# Patient Record
Sex: MALE | URBAN - METROPOLITAN AREA
[De-identification: names, ages, dates, MRNs, and addresses within clinical notes are randomized per-mention and may not be internally consistent; named-entity substitution may affect disease eponyms.]

---

## 2024-10-31 ENCOUNTER — HOSPITAL ENCOUNTER (INPATIENT)
Age: 54
LOS: 4 days | Discharge: HOME | DRG: 885 | End: 2024-11-04
Attending: PSYCHIATRY & NEUROLOGY | Admitting: PSYCHIATRY & NEUROLOGY
Payer: COMMERCIAL

## 2024-10-31 PROBLEM — F23 ACUTE PSYCHOSIS (HCC): Status: ACTIVE | Noted: 2024-10-31

## 2024-10-31 PROCEDURE — 6370000000 HC RX 637 (ALT 250 FOR IP): Performed by: PSYCHIATRY & NEUROLOGY

## 2024-10-31 PROCEDURE — 2040000000 HC PSYCH ICU R&B

## 2024-10-31 RX ORDER — LORAZEPAM 2 MG/ML
2 INJECTION INTRAMUSCULAR EVERY 6 HOURS PRN
Status: DISCONTINUED | OUTPATIENT
Start: 2024-10-31 | End: 2024-11-04 | Stop reason: HOSPADM

## 2024-10-31 RX ORDER — POLYETHYLENE GLYCOL 3350 17 G
2 POWDER IN PACKET (EA) ORAL
Status: DISCONTINUED | OUTPATIENT
Start: 2024-10-31 | End: 2024-11-04 | Stop reason: HOSPADM

## 2024-10-31 RX ORDER — IBUPROFEN 400 MG/1
400 TABLET, FILM COATED ORAL EVERY 6 HOURS PRN
Status: DISCONTINUED | OUTPATIENT
Start: 2024-10-31 | End: 2024-11-04 | Stop reason: HOSPADM

## 2024-10-31 RX ORDER — ACETAMINOPHEN 325 MG/1
650 TABLET ORAL EVERY 6 HOURS PRN
Status: DISCONTINUED | OUTPATIENT
Start: 2024-10-31 | End: 2024-11-04 | Stop reason: HOSPADM

## 2024-10-31 RX ORDER — DIPHENHYDRAMINE HYDROCHLORIDE 50 MG/ML
50 INJECTION INTRAMUSCULAR; INTRAVENOUS EVERY 6 HOURS PRN
Status: DISCONTINUED | OUTPATIENT
Start: 2024-10-31 | End: 2024-11-04 | Stop reason: HOSPADM

## 2024-10-31 RX ORDER — LORAZEPAM 1 MG/1
2 TABLET ORAL EVERY 6 HOURS PRN
Status: DISCONTINUED | OUTPATIENT
Start: 2024-10-31 | End: 2024-11-04 | Stop reason: HOSPADM

## 2024-10-31 RX ORDER — TRAZODONE HYDROCHLORIDE 50 MG/1
50 TABLET, FILM COATED ORAL NIGHTLY PRN
Status: DISCONTINUED | OUTPATIENT
Start: 2024-10-31 | End: 2024-11-04 | Stop reason: HOSPADM

## 2024-10-31 RX ORDER — MAGNESIUM HYDROXIDE/ALUMINUM HYDROXICE/SIMETHICONE 120; 1200; 1200 MG/30ML; MG/30ML; MG/30ML
30 SUSPENSION ORAL EVERY 6 HOURS PRN
Status: DISCONTINUED | OUTPATIENT
Start: 2024-10-31 | End: 2024-11-04 | Stop reason: HOSPADM

## 2024-10-31 RX ORDER — HALOPERIDOL 5 MG/1
5 TABLET ORAL EVERY 6 HOURS PRN
Status: DISCONTINUED | OUTPATIENT
Start: 2024-10-31 | End: 2024-11-04 | Stop reason: HOSPADM

## 2024-10-31 RX ORDER — POLYETHYLENE GLYCOL 3350 17 G/17G
17 POWDER, FOR SOLUTION ORAL DAILY PRN
Status: DISCONTINUED | OUTPATIENT
Start: 2024-10-31 | End: 2024-11-04 | Stop reason: HOSPADM

## 2024-10-31 RX ORDER — HYDROXYZINE HYDROCHLORIDE 50 MG/1
50 TABLET, FILM COATED ORAL 3 TIMES DAILY PRN
Status: DISCONTINUED | OUTPATIENT
Start: 2024-10-31 | End: 2024-11-04 | Stop reason: HOSPADM

## 2024-10-31 RX ORDER — HALOPERIDOL 5 MG/ML
5 INJECTION INTRAMUSCULAR EVERY 6 HOURS PRN
Status: DISCONTINUED | OUTPATIENT
Start: 2024-10-31 | End: 2024-11-04 | Stop reason: HOSPADM

## 2024-10-31 RX ADMIN — TRAZODONE HYDROCHLORIDE 50 MG: 50 TABLET ORAL at 23:21

## 2024-10-31 RX ADMIN — HYDROXYZINE HYDROCHLORIDE 50 MG: 50 TABLET, FILM COATED ORAL at 23:21

## 2024-10-31 ASSESSMENT — PATIENT HEALTH QUESTIONNAIRE - PHQ9
1. LITTLE INTEREST OR PLEASURE IN DOING THINGS: SEVERAL DAYS
SUM OF ALL RESPONSES TO PHQ QUESTIONS 1-9: 2
SUM OF ALL RESPONSES TO PHQ QUESTIONS 1-9: 2
SUM OF ALL RESPONSES TO PHQ9 QUESTIONS 1 & 2: 2
SUM OF ALL RESPONSES TO PHQ QUESTIONS 1-9: 2
2. FEELING DOWN, DEPRESSED OR HOPELESS: SEVERAL DAYS
SUM OF ALL RESPONSES TO PHQ QUESTIONS 1-9: 2

## 2024-10-31 ASSESSMENT — SLEEP AND FATIGUE QUESTIONNAIRES
DO YOU HAVE DIFFICULTY SLEEPING: YES
AVERAGE NUMBER OF SLEEP HOURS: 0
SLEEP PATTERN: DIFFICULTY FALLING ASLEEP;DISTURBED/INTERRUPTED SLEEP;RESTLESSNESS
DO YOU USE A SLEEP AID: NO

## 2024-10-31 ASSESSMENT — LIFESTYLE VARIABLES
HOW OFTEN DO YOU HAVE A DRINK CONTAINING ALCOHOL: NEVER
HOW MANY STANDARD DRINKS CONTAINING ALCOHOL DO YOU HAVE ON A TYPICAL DAY: PATIENT DOES NOT DRINK

## 2024-11-01 PROBLEM — E29.1 HYPOGONADISM MALE: Status: ACTIVE | Noted: 2024-11-01

## 2024-11-01 PROCEDURE — 2040000000 HC PSYCH ICU R&B

## 2024-11-01 PROCEDURE — 6370000000 HC RX 637 (ALT 250 FOR IP): Performed by: PSYCHIATRY & NEUROLOGY

## 2024-11-01 PROCEDURE — APPSS60 APP SPLIT SHARED TIME 46-60 MINUTES

## 2024-11-01 PROCEDURE — 99223 1ST HOSP IP/OBS HIGH 75: CPT | Performed by: PSYCHIATRY & NEUROLOGY

## 2024-11-01 PROCEDURE — 99222 1ST HOSP IP/OBS MODERATE 55: CPT | Performed by: INTERNAL MEDICINE

## 2024-11-01 RX ORDER — TESTOSTERONE CYPIONATE 200 MG/ML
150 VIAL (ML) INTRAMUSCULAR WEEKLY
Status: ON HOLD | COMMUNITY
Start: 2024-08-01 | End: 2024-11-04 | Stop reason: HOSPADM

## 2024-11-01 RX ORDER — HYDROXYZINE HYDROCHLORIDE 25 MG/1
25 TABLET, FILM COATED ORAL EVERY 6 HOURS PRN
COMMUNITY

## 2024-11-01 RX ORDER — QUETIAPINE FUMARATE 100 MG/1
100 TABLET, FILM COATED ORAL 2 TIMES DAILY
Status: DISCONTINUED | OUTPATIENT
Start: 2024-11-01 | End: 2024-11-02

## 2024-11-01 RX ADMIN — NICOTINE POLACRILEX 2 MG: 4 LOZENGE ORAL at 15:48

## 2024-11-01 RX ADMIN — NICOTINE POLACRILEX 2 MG: 4 LOZENGE ORAL at 20:17

## 2024-11-01 RX ADMIN — TRAZODONE HYDROCHLORIDE 50 MG: 50 TABLET ORAL at 21:09

## 2024-11-01 RX ADMIN — NICOTINE POLACRILEX 2 MG: 4 LOZENGE ORAL at 13:40

## 2024-11-01 RX ADMIN — NICOTINE POLACRILEX 2 MG: 4 LOZENGE ORAL at 11:40

## 2024-11-01 RX ADMIN — NICOTINE POLACRILEX 2 MG: 4 LOZENGE ORAL at 17:58

## 2024-11-01 RX ADMIN — HYDROXYZINE HYDROCHLORIDE 50 MG: 50 TABLET, FILM COATED ORAL at 21:09

## 2024-11-01 RX ADMIN — NICOTINE POLACRILEX 2 MG: 4 LOZENGE ORAL at 09:33

## 2024-11-01 RX ADMIN — QUETIAPINE FUMARATE 100 MG: 100 TABLET ORAL at 21:09

## 2024-11-01 ASSESSMENT — LIFESTYLE VARIABLES
HOW OFTEN DO YOU HAVE A DRINK CONTAINING ALCOHOL: NEVER
HOW MANY STANDARD DRINKS CONTAINING ALCOHOL DO YOU HAVE ON A TYPICAL DAY: PATIENT DOES NOT DRINK
HOW OFTEN DO YOU HAVE A DRINK CONTAINING ALCOHOL: PATIENT DECLINED
HOW MANY STANDARD DRINKS CONTAINING ALCOHOL DO YOU HAVE ON A TYPICAL DAY: PATIENT DECLINED

## 2024-11-01 NOTE — GROUP NOTE
Group Therapy Note    Date: 11/1/2024    Group Start Time: 0900  Group End Time: 0930  Group Topic: Community Meeting    Tabitha Fletcher        Group Therapy Note    Attendees: 6/7       Patient's Goal:  \"Speak to the doctor and come up with a plan\"    Notes:  Goal setting    Status After Intervention:  Unchanged    Participation Level: Interactive    Participation Quality: Appropriate      Speech:  normal      Thought Process/Content: Logical      Affective Functioning: Congruent      Mood: depressed      Level of consciousness:  Alert      Response to Learning: Able to verbalize current knowledge/experience      Endings: None Reported    Modes of Intervention: Education, Support, Socialization, and Exploration      Discipline Responsible: Behavorial Health Tech      Signature:  Tabitha Beth

## 2024-11-01 NOTE — PLAN OF CARE
Problem: Self Harm/Suicidality  Goal: Will have no self-injury during hospital stay  Description: INTERVENTIONS:  1.  Ensure constant observer at bedside with Q15M safety checks  2.  Maintain a safe environment  3.  Secure patient belongings  4.  Ensure family/visitors adhere to safety recommendations  5.  Ensure safety tray has been added to patient's diet order  6.  Every shift and PRN: Re-assess suicidal risk via Frequent Screener    Outcome: Progressing   Patient denies suicidal ideas at this time. No sign of self harm noted. Patient encouraged to attend groups to work on coping skills for life stressors.  Problem: Involuntary Admit  Goal: Will cooperate with staff recommendations and doctor's orders and will demonstrate appropriate behavior  Description: INTERVENTIONS:  1. Treat underlying conditions and offer medication as ordered  2. Educate regarding involuntary admission procedures and rules  3. Contain excessive/inappropriate behavior per unit and hospital policies  Outcome: Progressing   Patient accepting of medication administration at this time and has demonstrated appropriate behaviors on the unit at this time.   Problem: Risk for Elopement  Goal: Patient will not exit the unit/facility without proper excort  Outcome: Progressing   Patient has not attempted to exit unit or facility at this time.Will continue to provide support as needed.

## 2024-11-01 NOTE — PLAN OF CARE
Problem: Self Harm/Suicidality  Goal: Will have no self-injury during hospital stay  Description: INTERVENTIONS:  1.  Ensure constant observer at bedside with Q15M safety checks  2.  Maintain a safe environment  3.  Secure patient belongings  4.  Ensure family/visitors adhere to safety recommendations  5.  Ensure safety tray has been added to patient's diet order  6.  Every shift and PRN: Re-assess suicidal risk via Frequent Screener    11/1/2024 0859 by Hilaria Gary, RN  Outcome: Progressing   Patient alert and oriented, brightened  denies suicidal and homicidal thoughts. Patient stated no hallucinations, paranoia  nor delusions .   Patient denies any anxiety or depression.  Patient reported sleep and appetite are good. Patient stated \" I don't want to take psych medications. The only medication I took was testosterone last Wednesday. The side effect give me an adrenaline rush. I don't want to be on any medications.\"  Patient stated \"I have a home to go to with my wife when I'm discharged\". Patient reported showered today. Will continue 15 minute safety checks.     Problem: Involuntary Admit  Goal: Will cooperate with staff recommendations and doctor's orders and will demonstrate appropriate behavior  Description: INTERVENTIONS:  1. Treat underlying conditions and offer medication as ordered  2. Educate regarding involuntary admission procedures and rules  3. Contain excessive/inappropriate behavior per unit and hospital policies  11/1/2024 0859 by Hilaria Gary, RN  Outcome: Progressing  Patient alert and oriented, brightened. Patient  socializing with peers. Patient reported don't want psych medications, is discharged focused.

## 2024-11-01 NOTE — H&P
this or any previous visit (from the past 24 hour(s)).    Imaging/Diagonstics:  No results found.    Assessment :      Hospital Problems             Last Modified POA    * (Principal) Acute psychosis (HCC) 10/31/2024 Yes    Hypogonadism male 11/1/2024 Yes       Plan:     Admitted to inpatient psych with acute psychosis,  Hypogonadism was on testosterone in the past, will follow with primary care physician,  Labs, radiology, medications, vitals reviewed,  Current smoker, advised to quit smoking    DVT prophylaxis,  Pt mobile   Full code status       Consultations:   IP CONSULT TO INTERNAL MEDICINE      Connie Cabezas MD  11/1/2024  5:21 PM    Copy sent to Dr. Garcia primary care provider on file.    Please note that this chart was generated using voice recognition Dragon dictation software.  Although every effort was made to ensure the accuracy of this automated transcription, some errors in transcription may have occurred.    
Elevated  Affect: Mood-congruent  Thought processes:  Linear and coherent but illogical in his delusions  Thought content: Denies suicidal ideations              Denies homicidal ideations   Hallucinations: Patient admits to both auditory and visual hallucinations  Delusions: Patient is grandiose, paranoid, and religiously preoccupied  Cognition:  Oriented to self, location, time, situation  Concentration: Clinically adequate  Memory: Intact  Insight &Judgment: Poor         DSM-5 Diagnosis    Principal Problem: Acute psychosis (HCC)        Psychosocial and Contextual factors:  Financial X  Occupational X  Relationship X  Legal   Living situation   Educational     History reviewed. No pertinent past medical history.     PATIENT HANDOFF:  Home medications reviewed.   Medication management per attending  Monitor need and frequency of PRN medications.    CONSULT:  [x] Yes [] No  Internal medicine for medical management/medical H&P      Risk Management: close watch per standard protocol      Psychotherapy: participation in milieu and group and individual sessions with Attending Physician,  and Physician Assistant/CNP      Estimated length of stay:  2-14 days      GENERAL PATIENT/FAMILY EDUCATION  Patient will understand basic signs and symptoms, patient will understand benefits/risks and potential side effects from proposed medications, and patient will understand their role in recovery.  Family is active in patient's care.   Patient assets that may be helpful during treatment include: Intent to participate and engage in treatment, sufficient fund of knowledge and intellect to understand and utilize treatments.    OUTCOME QUESTIONNAIRE (OQ 30.2):  [] Completed [x] Patient too ill to participate    Goals:    1) Remission of acute psychosis.  2) Stabilization of symptoms prior to discharge.  3) Establish efficacy and tolerability of medications.       Behavioral Services  Medicare Certification     Admission 
no

## 2024-11-01 NOTE — GROUP NOTE
Group Therapy Note    Date: 11/1/2024    Group Start Time: 1100  Group End Time: 1145  Group Topic: Psychoeducation    [facility]    Naima Call CTRS    Group Therapy Note    Attendees: 4/7     Patient's Goal:  Patient will demonstrate improved interpersonal skills    Notes:  Patient attended group and participated    Status After Intervention:  Improved    Participation Level: Interactive but often monopolizing    Participation Quality: Attentive, Sharing, and Intrusive      Speech:  pressured      Thought Process/Content: Delusional      Affective Functioning: Congruent      Mood: dysphoric      Level of consciousness:  Alert, Oriented x4, and Preoccupied      Response to Learning: Able to verbalize current knowledge/experience, Able to verbalize/acknowledge new learning, and Able to retain information      Endings: None Reported    Modes of Intervention: Education, Support, Socialization, and Exploration      Discipline Responsible: Psychoeducational Specialist      Signature:  SUMEET Boykin

## 2024-11-01 NOTE — CARE COORDINATION
[facility] Biopsychosocial Assessment    Current Level of Psychosocial Functioning     Independent   Dependent    Minimal Assist XX    Psychosocial High Risk Factors (check all that apply)    Unable to obtain meds   Chronic illness/pain    Substance abuse   Lack of Family Support   Financial stress   Isolation   Inadequate Community Resources  Suicide attempt(s)  Not taking medications   Victim of crime   Developmental Delay  Unable to manage personal needs    Age 65 or older   Homeless  No transportation   Readmission within 30 days  Unemployment  Traumatic Event XX    Psychiatric Advanced Directives: N/A    Family to Involve in Treatment: Wife    Sexual Orientation:  N/A    Patient Strengths: Housing, Income, Insurance    Patient Barriers: Psychosis    Opiate Education Provided:  Denies    [facility]/mental health history: No inpatient history. Not linked to [facility].    Plan of Care   medication management, group/individual therapies, family meetings, psycho -education, treatment team meetings to assist with stabilization    Initial Discharge Plan:  Return home, Needs linked to [facility]    Clinical Summary:    Sin is a 53yo admitted to the [facility] for acute psychosis. At time of assessment, he denies suicidal and homicidal ideations and hallucinations. He makes several bizarre statements throughout and stares at the ceiling the entire assessment, including inviting the writer to live with him and his wife as well as stating he has \"enough money to support the world and it's people.\" He also states \"I'm extremely intelligent,' without provocation.    Sin denies inpatient treatment history and is not linked to a [facility]. He states \"My radha beautiful beautiful wife is all I need,\" when asked about seeing a therapist or taking medications.  He denies opiate and other substance use; Informed of [facility] treatment resources.  He denies past or current legal concerns.  He denies past or current abuse concerns. He does report trauma; His son  in

## 2024-11-01 NOTE — GROUP NOTE
Group Therapy Note    Date: 11/1/2024    Group Start Time: 1400  Group End Time: 1430  Group Topic: Group Documentation    Tabitha Fletcher        Group Therapy Note    Attendees: 3/6       Patient's Goal:  Attend and participate in wellness group    Notes:  Spirituality as a coping mechanism    Status After Intervention:  Improved    Participation Level: Interactive    Participation Quality: Inappropriate      Speech:  pressured      Thought Process/Content: Delusional  Flight of ideas      Affective Functioning: Incongruent      Mood: anxious      Level of consciousness:  Alert      Response to Learning: Able to verbalize current knowledge/experience      Endings: None Reported    Modes of Intervention: Education, Support, Socialization, and Exploration      Discipline Responsible: Behavorial Health Tech      Signature:  Tabitha Beth

## 2024-11-02 LAB
CHOLEST SERPL-MCNC: 128 MG/DL (ref 0–199)
CHOLESTEROL/HDL RATIO: 3.5
EST. AVERAGE GLUCOSE BLD GHB EST-MCNC: 105 MG/DL
HBA1C MFR BLD: 5.3 % (ref 4–6)
HDLC SERPL-MCNC: 37 MG/DL
LDLC SERPL CALC-MCNC: 72 MG/DL (ref 0–100)
TRIGL SERPL-MCNC: 97 MG/DL (ref 0–149)

## 2024-11-02 PROCEDURE — 36415 COLL VENOUS BLD VENIPUNCTURE: CPT

## 2024-11-02 PROCEDURE — APPSS30 APP SPLIT SHARED TIME 16-30 MINUTES

## 2024-11-02 PROCEDURE — 99232 SBSQ HOSP IP/OBS MODERATE 35: CPT | Performed by: PSYCHIATRY & NEUROLOGY

## 2024-11-02 PROCEDURE — 83036 HEMOGLOBIN GLYCOSYLATED A1C: CPT

## 2024-11-02 PROCEDURE — 99232 SBSQ HOSP IP/OBS MODERATE 35: CPT | Performed by: INTERNAL MEDICINE

## 2024-11-02 PROCEDURE — 1240000000 HC EMOTIONAL WELLNESS R&B

## 2024-11-02 PROCEDURE — 80061 LIPID PANEL: CPT

## 2024-11-02 PROCEDURE — 6370000000 HC RX 637 (ALT 250 FOR IP): Performed by: PSYCHIATRY & NEUROLOGY

## 2024-11-02 RX ORDER — QUETIAPINE FUMARATE 200 MG/1
200 TABLET, FILM COATED ORAL 2 TIMES DAILY
Status: DISCONTINUED | OUTPATIENT
Start: 2024-11-02 | End: 2024-11-04 | Stop reason: HOSPADM

## 2024-11-02 RX ORDER — AMLODIPINE BESYLATE 5 MG/1
5 TABLET ORAL DAILY
Status: DISCONTINUED | OUTPATIENT
Start: 2024-11-02 | End: 2024-11-04 | Stop reason: HOSPADM

## 2024-11-02 RX ADMIN — QUETIAPINE FUMARATE 200 MG: 200 TABLET ORAL at 21:30

## 2024-11-02 RX ADMIN — TRAZODONE HYDROCHLORIDE 50 MG: 50 TABLET ORAL at 21:30

## 2024-11-02 RX ADMIN — HYDROXYZINE HYDROCHLORIDE 50 MG: 50 TABLET, FILM COATED ORAL at 21:30

## 2024-11-02 RX ADMIN — QUETIAPINE FUMARATE 100 MG: 100 TABLET ORAL at 09:09

## 2024-11-02 RX ADMIN — NICOTINE POLACRILEX 2 MG: 4 LOZENGE ORAL at 09:09

## 2024-11-02 RX ADMIN — NICOTINE POLACRILEX 2 MG: 4 LOZENGE ORAL at 11:59

## 2024-11-02 RX ADMIN — NICOTINE POLACRILEX 2 MG: 4 LOZENGE ORAL at 19:06

## 2024-11-02 RX ADMIN — NICOTINE POLACRILEX 2 MG: 4 LOZENGE ORAL at 21:32

## 2024-11-02 ASSESSMENT — LIFESTYLE VARIABLES
HOW MANY STANDARD DRINKS CONTAINING ALCOHOL DO YOU HAVE ON A TYPICAL DAY: PATIENT DECLINED
HOW OFTEN DO YOU HAVE A DRINK CONTAINING ALCOHOL: PATIENT DECLINED

## 2024-11-02 NOTE — PLAN OF CARE
Problem: Self Harm/Suicidality  Goal: Will have no self-injury during hospital stay  Description: INTERVENTIONS:  1.  Ensure constant observer at bedside with Q15M safety checks  2.  Maintain a safe environment  3.  Secure patient belongings  4.  Ensure family/visitors adhere to safety recommendations  5.  Ensure safety tray has been added to patient's diet order  6.  Every shift and PRN: Re-assess suicidal risk via Frequent Screener    11/1/2024 2009 by Claribel Nair LPN  Note: Patient denies thoughts of self harm at this time. Patient agrees to seek out staff if they begin having thoughts of harming self or they need to talk. Q15min safety checks continue     Problem: Involuntary Admit  Goal: Will cooperate with staff recommendations and doctor's orders and will demonstrate appropriate behavior  Description: INTERVENTIONS:  1. Treat underlying conditions and offer medication as ordered  2. Educate regarding involuntary admission procedures and rules  3. Contain excessive/inappropriate behavior per unit and hospital policies  11/1/2024 2009 by Claribel Nair LPN  Note: Patient denies suicidal thoughts and hallucinations. He reports minimal depression and anxiety. He is slightly dismissive during talk time and initially kept his eyes closed and his face in the pillow, he then sat up to answer questions and on occasion had inappropriate laughter. He has been out in the milieu at times and social with peers. Q15min safety checks continue

## 2024-11-02 NOTE — GROUP NOTE
Group Therapy Note    Date: 11/2/2024    Group Start Time: 1005  Group End Time: 1025  Group Topic: Psychoeducation    Omaira Sandoval MSW, LSW        Group Therapy Note    Attendees: 3/10       Patient's Goal:  Expression of feeling    Notes:  Therapeutic conversation game provided and discussed.    Status After Intervention:  Unchanged    Participation Level: Monopolizing    Participation Quality: Intrusive      Speech:  normal      Thought Process/Content: Flight of ideas      Affective Functioning: Exaggerated      Mood: euthymic      Level of consciousness:  Alert and Oriented x4      Response to Learning: Progressing to goal      Endings: None Reported    Modes of Intervention: Support      Discipline Responsible: /Counselor      Signature:  NADIR Schmidt LSW

## 2024-11-02 NOTE — PLAN OF CARE
Problem: Self Harm/Suicidality  Goal: Will have no self-injury during hospital stay  Description: INTERVENTIONS:  1.  Ensure constant observer at bedside with Q15M safety checks  2.  Maintain a safe environment  3.  Secure patient belongings  4.  Ensure family/visitors adhere to safety recommendations  5.  Ensure safety tray has been added to patient's diet order  6.  Every shift and PRN: Re-assess suicidal risk via Frequent Screener    Outcome: Progressing  Note: Patient denies all, shows poor insight into his mental health and repeats \"this is all just temporary\" when attempting to discuss with staff. He is cooperative with staff and peers, however can be intrusive and over step boundaries. Patient has remained in behavorial control while on unit and has not required emergency medications in the last 24 hours. Patient denies any suicidal/homicidal ideation at this time. Will continue to monitor, assess, and provide a safe environment through Q15 minute safety checks.      Problem: Involuntary Admit  Goal: Will cooperate with staff recommendations and doctor's orders and will demonstrate appropriate behavior  Description: INTERVENTIONS:  1. Treat underlying conditions and offer medication as ordered  2. Educate regarding involuntary admission procedures and rules  3. Contain excessive/inappropriate behavior per unit and hospital policies  Outcome: Not Progressing

## 2024-11-03 PROCEDURE — APPSS30 APP SPLIT SHARED TIME 16-30 MINUTES

## 2024-11-03 PROCEDURE — 99232 SBSQ HOSP IP/OBS MODERATE 35: CPT | Performed by: INTERNAL MEDICINE

## 2024-11-03 PROCEDURE — 1240000000 HC EMOTIONAL WELLNESS R&B

## 2024-11-03 PROCEDURE — 6370000000 HC RX 637 (ALT 250 FOR IP): Performed by: PSYCHIATRY & NEUROLOGY

## 2024-11-03 PROCEDURE — 90833 PSYTX W PT W E/M 30 MIN: CPT | Performed by: PSYCHIATRY & NEUROLOGY

## 2024-11-03 PROCEDURE — 99232 SBSQ HOSP IP/OBS MODERATE 35: CPT | Performed by: PSYCHIATRY & NEUROLOGY

## 2024-11-03 RX ADMIN — NICOTINE POLACRILEX 2 MG: 4 LOZENGE ORAL at 17:00

## 2024-11-03 RX ADMIN — HYDROXYZINE HYDROCHLORIDE 50 MG: 50 TABLET, FILM COATED ORAL at 22:02

## 2024-11-03 RX ADMIN — QUETIAPINE FUMARATE 200 MG: 200 TABLET ORAL at 20:21

## 2024-11-03 RX ADMIN — TRAZODONE HYDROCHLORIDE 50 MG: 50 TABLET ORAL at 22:02

## 2024-11-03 RX ADMIN — HALOPERIDOL 5 MG: 5 TABLET ORAL at 01:58

## 2024-11-03 RX ADMIN — NICOTINE POLACRILEX 2 MG: 4 LOZENGE ORAL at 20:21

## 2024-11-03 RX ADMIN — QUETIAPINE FUMARATE 200 MG: 200 TABLET ORAL at 09:15

## 2024-11-03 RX ADMIN — NICOTINE POLACRILEX 2 MG: 4 LOZENGE ORAL at 09:16

## 2024-11-03 RX ADMIN — LORAZEPAM 2 MG: 1 TABLET ORAL at 01:58

## 2024-11-03 RX ADMIN — NICOTINE POLACRILEX 2 MG: 4 LOZENGE ORAL at 13:03

## 2024-11-03 NOTE — GROUP NOTE
Group Therapy Note    Date: 11/3/2024    Group Start Time: 1400  Group End Time: 1500  Group Topic: Group Documentation    [facility] BHClaribel Hardwick LPN        Group Therapy Note    Attendees: 5/10       Patient's Goal:  coping skills at home     Notes:  Patient was able to share and discover coping skills with peers     Status After Intervention:  Improved    Participation Level: Active Listener and Interactive    Participation Quality: Appropriate, Attentive, and Sharing      Speech:  normal      Thought Process/Content: Logical  Linear      Affective Functioning: Congruent      Mood: euthymic      Level of consciousness:  Alert and Oriented x4      Response to Learning: Progressing to goal      Endings: None Reported    Modes of Intervention: Education, Support, and Socialization      Discipline Responsible: Licensed Practical Nurse      Signature:  Claribel Castillo LPN

## 2024-11-03 NOTE — PLAN OF CARE
Problem: Self Harm/Suicidality  Goal: Will have no self-injury during hospital stay  Description: INTERVENTIONS:  1.  Ensure constant observer at bedside with Q15M safety checks  2.  Maintain a safe environment  3.  Secure patient belongings  4.  Ensure family/visitors adhere to safety recommendations  5.  Ensure safety tray has been added to patient's diet order  6.  Every shift and PRN: Re-assess suicidal risk via Frequent Screener    Outcome: Progressing   Patient denies both suicidal and homicidal ideations. Patient denies both auditory and visual disturbances. Patient denies both anxiety and depression. Patient is selective when taking medications. Patient reports eating and sleeping adequately with safety checks Q15 minutes and at irregular intervals.   Problem: Involuntary Admit  Goal: Will cooperate with staff recommendations and doctor's orders and will demonstrate appropriate behavior  Description: INTERVENTIONS:  1. Treat underlying conditions and offer medication as ordered  2. Educate regarding involuntary admission procedures and rules  3. Contain excessive/inappropriate behavior per unit and hospital policies  Outcome: Progressing     Patient denies both suicidal and homicidal ideations. Patient denies both auditory and visual disturbances. Patient denies both anxiety and depression. Patient is selective when taking medications. Patient reports eating and sleeping adequately with safety checks Q15 minutes and at irregular intervals.   Problem: Nutrition Deficit:  Goal: Optimize nutritional status  Outcome: Progressing   Patient denies both suicidal and homicidal ideations. Patient denies both auditory and visual disturbances. Patient denies both anxiety and depression. Patient is selective when taking medications. Patient reports eating and sleeping adequately with safety checks Q15 minutes and at irregular intervals.   Problem: Safety - Adult  Goal: Free from fall injury  Outcome: Progressing

## 2024-11-03 NOTE — PLAN OF CARE
[facility]  Day 3 Interdisciplinary Treatment Plan NOTE    Review Date & Time: 11/3/24 0900    Admission Type:   Admission Type: Involuntary    Reason for admission:  Reason for Admission: Patient brought in by police due to running around asking for his dead son to be brought back to life. Patient sons death anniversary is Nov 2nd and reports not sleeping in 5 days. Patient was verbally agressive in the ED and required emergency medications and restraints. Upon admission patient denies Suicidal idealization, depression or anxiety. Patient is calm and cooperative.  Estimated Length of Stay:  5-7 days  Estimated Discharge Date Update:   to be determined by physician    PATIENT STRENGTHS:  Patient Strengths:   Patient Strengths and Limitations:Limitations: External locus of control, Unrealistic self-view, Inappropriate/potentially harmful leisure interests  Addictive Behavior:Addictive Behavior  In the Past 3 Months, Have You Felt or Has Someone Told You That You Have a Problem With  : None  Medical Problems:History reviewed. No pertinent past medical history.    Risk:  Fall Risk   Reji Scale Reji Scale Score: 22  BVC    Change in scores:  No Changes to plan of Care:  No    Status EXAM:   Mental Status and Behavioral Exam  Normal: No  Level of Assistance: Independent/Self  Facial Expression: Worried  Affect: Unstable  Level of Consciousness: Alert  Frequency of Checks: 4 times per hour, close  Mood:Normal: No  Mood: Anxious, Suspicious  Motor Activity:Normal: Yes  Motor Activity: Decreased  Eye Contact: Good  Observed Behavior: Preoccupied, Impulsive  Sexual Misconduct History: Current - no  Preception: [place] to person, [place] to time, [place] to place  Attention:Normal: No  Attention: Distractible  Thought Processes: Blocking, Loose association  Thought Content:Normal: No  Thought Content: Delusions, Paranoia, Preoccupations  Depression Symptoms: Impaired concentration  Anxiety Symptoms:

## 2024-11-03 NOTE — PLAN OF CARE
Problem: Self Harm/Suicidality  Goal: Will have no self-injury during hospital stay  Description: INTERVENTIONS:  1.  Ensure constant observer at bedside with Q15M safety checks  2.  Maintain a safe environment  3.  Secure patient belongings  4.  Ensure family/visitors adhere to safety recommendations  5.  Ensure safety tray has been added to patient's diet order  6.  Every shift and PRN: Re-assess suicidal risk via Frequent Screener    11/3/2024 0223 by Garfield SUE, RN  Outcome: Progressing   Patient denies thoughts of self harm. Patient remains free from injury.   Problem: Involuntary Admit  Goal: Will cooperate with staff recommendations and doctor's orders and will demonstrate appropriate behavior  Description: INTERVENTIONS:  1. Treat underlying conditions and offer medication as ordered  2. Educate regarding involuntary admission procedures and rules  3. Contain excessive/inappropriate behavior per unit and hospital policies  11/3/2024 0223 by Garfield SUE, RN  Outcome: Progressing   Patient is anxious. Patient is social with peers but can intrusive at times. Patient does have one outburst of yelling and threatening due to another patient being disruptive on unit. Patient is compliant with medications.

## 2024-11-04 VITALS
OXYGEN SATURATION: 100 % | SYSTOLIC BLOOD PRESSURE: 153 MMHG | TEMPERATURE: 98.3 F | DIASTOLIC BLOOD PRESSURE: 86 MMHG | HEART RATE: 72 BPM | WEIGHT: 175 LBS | RESPIRATION RATE: 14 BRPM | BODY MASS INDEX: 23.7 KG/M2 | HEIGHT: 72 IN

## 2024-11-04 PROCEDURE — 99232 SBSQ HOSP IP/OBS MODERATE 35: CPT | Performed by: INTERNAL MEDICINE

## 2024-11-04 PROCEDURE — 99239 HOSP IP/OBS DSCHRG MGMT >30: CPT | Performed by: PSYCHIATRY & NEUROLOGY

## 2024-11-04 PROCEDURE — 6370000000 HC RX 637 (ALT 250 FOR IP): Performed by: PSYCHIATRY & NEUROLOGY

## 2024-11-04 RX ORDER — QUETIAPINE FUMARATE 200 MG/1
200 TABLET, FILM COATED ORAL 2 TIMES DAILY
Qty: 60 TABLET | Refills: 3 | Status: SHIPPED | OUTPATIENT
Start: 2024-11-04

## 2024-11-04 RX ORDER — AMLODIPINE BESYLATE 5 MG/1
5 TABLET ORAL DAILY
Qty: 30 TABLET | Refills: 3 | Status: SHIPPED | OUTPATIENT
Start: 2024-11-05

## 2024-11-04 RX ADMIN — QUETIAPINE FUMARATE 200 MG: 200 TABLET ORAL at 08:29

## 2024-11-04 RX ADMIN — NICOTINE POLACRILEX 2 MG: 4 LOZENGE ORAL at 06:10

## 2024-11-04 RX ADMIN — NICOTINE POLACRILEX 2 MG: 4 LOZENGE ORAL at 11:06

## 2024-11-04 NOTE — GROUP NOTE
Group Therapy Note    Date: 11/4/2024    Group Start Time: 1000  Group End Time: 1025  Group Topic: Psychotherapy    [facility] BHEdna Sorto MSW, LSW        Group Therapy Note    Attendees: 4/10       Patient's Goal:  Increase interpersonal relationship skills utilizing talk therapy while discussing strengths and how to use them daily.    Notes:  Required redirection numerous time for interrupting, or speaking on behalf of the others in group.     Status After Intervention:  Unchanged    Participation Level: Active Listener, Interactive, and Monopolizing    Participation Quality: Attentive and Intrusive      Speech:  normal      Thought Process/Content: Logical      Affective Functioning: Congruent      Mood: anxious      Level of consciousness:  Alert      Response to Learning: Able to verbalize current knowledge/experience      Endings: None Reported    Modes of Intervention: Education, Support, and Socialization      Discipline Responsible: /Counselor      Signature:  NADIR Bloom LSW

## 2024-11-04 NOTE — GROUP NOTE
Group Therapy Note    Date: 11/4/2024    Group Start Time: 1100  Group End Time: 1145  Group Topic: Psychoeducation    [facility]    Naima Call CTRS    Group Therapy Note    Attendees: 4/10     Patient's Goal:  Patient will demonstrate improved interpersonal skills    Notes:  Patient attended group and participated    Status After Intervention:  Unchanged    Participation Level: Monopolizing    Participation Quality: Inappropriate and Intrusive      Speech:  pressured      Thought Process/Content: Flight of ideas      Affective Functioning: Congruent      Mood: euthymic      Level of consciousness:  Alert and Oriented x4      Response to Learning: Able to verbalize current knowledge/experience and Able to verbalize/acknowledge new learning      Endings: None Reported    Modes of Intervention: Education, Support, Socialization, and Exploration      Discipline Responsible: Psychoeducational Specialist      Signature:  SUMEET Boykin

## 2024-11-04 NOTE — TRANSITION OF CARE
Behavioral Health Transition Record    Patient Name: Sin Dumont  YOB: 1970   Medical Record Number: [id number]  Date of Admission: 10/31/2024  6:24 PM   Date of Discharge: 11/4/24    Attending Provider: Nael Berman MD   Discharging Provider: Dr. Berman  To contact this individual call  [phone] and ask the  to page.  If unavailable, ask to be transferred to Behavioral Health Provider on call.  A Behavioral Health Provider will be available on call 24/7 and during holidays.    Primary Care Provider: No primary care provider on file.    No Known Allergies    Reason for Admission: Patient brought in by police due to running around asking for his dead son to be brought back to life. Patient sons death anniversary is Nov 2nd and reports not sleeping in 5 days. Patient was verbally agressive in the ED and required emergency medications and restraints.     Admission Diagnosis: Acute psychosis (HCC) [F23]    * No surgery found *    Results for orders placed or performed during the hospital encounter of 10/31/24   Lipid Panel   Result Value Ref Range    Cholesterol, Total 128 0 - 199 mg/dL    HDL 37 (L) >40 mg/dL    LDL Cholesterol 72 0 - 100 mg/dL    Chol/HDL Ratio 3.5     Triglycerides 97 0 - 149 mg/dL   Hemoglobin A1C   Result Value Ref Range    Hemoglobin A1C 5.3 4.0 - 6.0 %    Estimated Avg Glucose 105 mg/dL       Immunizations administered during this encounter:   There is no immunization history on file for this patient.  Influenza Vaccination Status: None of the above/Not documented/Unable to determine from medical record documentation    Screening for Metabolic Disorders for Patients on Antipsychotic Medications  (Data obtained from the EMR)    Estimated Body Mass Index  Body mass index is 23.73 kg/m².      Vital Signs/Blood Pressure  BP (!) 153/86   Pulse 72   Temp 98.3 °F (36.8 °C) (Oral)   Resp 14   Ht 1.829 m (6')   Wt 79.4 kg (175 lb)   SpO2 100%   BMI 23.73 kg/m²

## 2024-11-04 NOTE — PROGRESS NOTES
Behavioral Services  Medicare Certification Upon Admission    I certify that this patient's inpatient psychiatric hospital admission is medically necessary for:    [x] (1) Treatment which could reasonably be expected to improve this patient's condition,       [x] (2) Or for diagnostic study;     AND     [x](2) The inpatient psychiatric services are provided while the individual is under the care of a physician and are included in the individualized plan of care.    Estimated length of stay/service 2-9 days    Plan for post-hospital care -outpatient care    Electronically signed by ÁNGEL GROSS MD on 11/1/2024 at 9:24 AM      
    [facility] Internal Medicine  Kurt Melendez MD; Cory Ruano MD, Christopher Tolbert MD, Keily Mcgarry MD, Sophia Barrera MD; Connie Cabezas MD    [facility] Internal Medicine   IN-PATIENT SERVICE   [facility]     HISTORY AND PHYSICAL EXAMINATION            Date:   11/3/2024  Patient name:  Sin Dumont  Date of admission:  10/31/2024  6:24 PM  MRN:   [id number]  Account:  [id number]  YOB: 1970  PCP:    No primary care provider on file.  Room:   [address] [place] [address]  Code Status:    Full Code      Chief Complaint:     Suicidal /Ac Psychosis    History Obtained From:     Patient/EMR/bedside RN     History of Present Illness:   54-year gentleman with underlying history of anxiety, depression, psychosis, admitted to inpatient psych for suicidal ideations and acute psychosis      Past Medical History:     History reviewed. No pertinent past medical history.     Past Surgical History:     No past surgical history on file.     Medications Prior to Admission:     Prior to Admission medications    Medication Sig Start Date End Date Taking? Authorizing Provider   Testosterone Cypionate 200 MG/ML SOLN Inject 150 mg into the muscle once a week Max Daily Amount: 150 mg 8/1/24   Provider, MD Sarah   hydrOXYzine HCl (ATARAX) 25 MG tablet Take 1 tablet by mouth every 6 hours as needed for Anxiety    ProviderSarah MD        Allergies:     Patient has no known allergies.    Social History:     Tobacco:    has no history on file for tobacco use.  Alcohol:      has no history on file for alcohol use.  Drug Use:  has no history on file for drug use.    Family History:     No family history on file.    Review of Systems:     Positive and Negative as described in HPI.    CONSTITUTIONAL:  negative for fevers, chills, sweats, fatigue, weight loss  HEENT:  negative for vision, hearing changes, runny nose, throat pain  RESPIRATORY:  negative for shortness of 
    [facility] Internal Medicine  Kurt Melendez MD; Cory Ruano MD, Christopher Tolbert MD, Keily Mcgarry MD, Sophia Barrera MD; Connie Cabezas MD    [facility] Internal Medicine   IN-PATIENT SERVICE   [place]     HISTORY AND PHYSICAL EXAMINATION            Date:   11/2/2024  Patient name:  Sin Dumont  Date of admission:  10/31/2024  6:24 PM  MRN:   [id number]  Account:  [id number]  YOB: 1970  PCP:    No primary care provider on file.  Room:   [address] [place] [address]  Code Status:    Full Code      Chief Complaint:     Suicidal /Ac Psychosis    History Obtained From:     Patient/EMR/bedside RN     History of Present Illness:   54-year gentleman with underlying history of anxiety, depression, psychosis, admitted to inpatient psych for suicidal ideations and acute psychosis      Past Medical History:     History reviewed. No pertinent past medical history.     Past Surgical History:     No past surgical history on file.     Medications Prior to Admission:     Prior to Admission medications    Medication Sig Start Date End Date Taking? Authorizing Provider   Testosterone Cypionate 200 MG/ML SOLN Inject 150 mg into the muscle once a week Max Daily Amount: 150 mg 8/1/24   Provider, MD Sarah   hydrOXYzine HCl (ATARAX) 25 MG tablet Take 1 tablet by mouth every 6 hours as needed for Anxiety    ProviderSarah MD        Allergies:     Patient has no known allergies.    Social History:     Tobacco:    has no history on file for tobacco use.  Alcohol:      has no history on file for alcohol use.  Drug Use:  has no history on file for drug use.    Family History:     No family history on file.    Review of Systems:     Positive and Negative as described in HPI.    CONSTITUTIONAL:  negative for fevers, chills, sweats, fatigue, weight loss  HEENT:  negative for vision, hearing changes, runny nose, throat pain  RESPIRATORY:  negative for shortness of 
  Daily Progress Note  11/3/2024    Patient Name: Sin Dumont    CHIEF COMPLAINT: Acute psychosis         SUBJECTIVE:    Patient is seen today for a follow up assessment.  He is found walking in the hallway.  He agrees to conduct interview in room with door open.  Roommate absent duration of encounter.  He presents as minimizing.  He describes his mood as \"fine\".  He required oral Ativan 2 mg and Haldol 5 mg overnight at 0158 for disruptive behaviors of yelling and threatening.  He reports being awakened last night due to environmental noise.  Reports achieving a total of 8 hours sleep.  He admits to persistent anxiety and agitation. He remains discharged focused. He endorses life stressors. States \"they will all go away way once I get out of here\".  He denies suicidal or homicidal ideations.  Endorses quieting of auditory hallucinations.  Denies visual hallucinations or paranoia. He presents with elevation of mood, poor judgment, and irritability.  Staff reports the patient was giving away his close yesterday despite redirection.  He is reported as intrusive at times.  He is compliant with taking scheduled psychotropic medications.  No adverse effects reported.  Recent titration to Seroquel 200 mg twice daily.    Appetite:  [x] Adequate/Unchanged  [] Increased  [] Decreased      Sleep:       [] Adequate/Unchanged  [x] Fair  [] Poor      Group Attendance on Unit:   [x] Yes   [] Selectively    [] No    Compliant with scheduled medications: [x] Yes  [] No    Received emergency medications in past 24 hrs: [x] Yes   [] No    Medication Side Effects: Denies         Mental Status Exam  Level of consciousness: Alert and awake   Appearance: Appropriate attire for setting, standing, with fair  grooming and hygiene   Behavior/Motor: Approachable, engages with interviewer, no psychomotor abnormalities   Attitude toward examiner: Cooperative, attentive, good eye contact  Speech: Blunted, normal tone  Mood: Elevated  Affect: 
Pharmacy Medication History Note      List of current medications patient is taking is complete.    Source of information: [facility] ([phone]), Epic, PDMP, [employer] dispense report    Changes made to medication list:  Medications removed (include reason, ex. therapy complete or physician discontinued, noncompliance):  none    Medications flagged for provider review:  none    Medications added/doses adjusted:  Added Testosterone 150 mg IM weekly - LF 8/1/24 (qty 10 mL for 91 day supply)  Added Hydroxyzine 25 mg every 6 hours as needed        Current Home Medication List at Time of Admission:  Prior to Admission medications    Medication Sig   Testosterone Cypionate 200 MG/ML SOLN Inject 150 mg into the muscle once a week Max Daily Amount: 150 mg   hydrOXYzine HCl (ATARAX) 25 MG tablet Take 1 tablet by mouth every 6 hours as needed for Anxiety         Please let me know if you have any questions about this encounter. Thank you!    Electronically signed by Carmen Arthur RPH on 11/1/2024 at 10:32 AM     
RT ASSESSMENT TREATMENT GOALS    [x]Pt Goal:  Pt will identify 1-2 positive coping skills by time of discharge.    []Pt Goal:  Pt will identify 1-2 positive aspects of self by time of discharge.    [x]Pt Goal:  Pt will remain on task/topic for 15-30 minutes during group by time of discharge.    []Pt Goal:  Pt will identify 1-2 aspects of relapse prevention plan by time of discharge.    []Pt Goal:  Pt will join in conversation with peers 1-2 times per group by time of discharge.    []Pt Goal:  Pt will identify 1-2 new leisure interests by time of discharge.    [x]Pt Goal: Pt will maintain behavorial control until the time of discharge. '  
life stress as evidenced by poor intake prior to admission    Nutrition Interventions:   Food and/or Nutrient Delivery: Continue Current Diet, Continue Oral Nutrition Supplement  Nutrition Education/Counseling: No recommendation at this time  Coordination of Nutrition Care: Continue to monitor while inpatient       Goals:  Goals: PO intake 75% or greater  Type of Goal: New goal  Previous Goal Met: New Goal    Nutrition Monitoring and Evaluation:      Food/Nutrient Intake Outcomes: Food and Nutrient Intake, Supplement Intake  Physical Signs/Symptoms Outcomes: Biochemical Data, GI Status, Fluid Status or Edema, Skin, Weight    Discharge Planning:    Continue current diet     Paris Waldrop RD, LD  Contact: [phone]    
nose, throat pain  RESPIRATORY:  negative for shortness of breath, cough, congestion, wheezing.  CARDIOVASCULAR:  negative for chest pain, palpitations.  GASTROINTESTINAL:  negative for nausea, vomiting, diarrhea, constipation, change in bowel habits, abdominal pain   GENITOURINARY:  negative for difficulty of urination, burning with urination, frequency   INTEGUMENT:  negative for rash, skin lesions, easy bruising   HEMATOLOGIC/LYMPHATIC:  negative for swelling/edema   ALLERGIC/IMMUNOLOGIC:  negative for urticaria , itching  ENDOCRINE:  negative increase in drinking, increase in urination, hot or cold intolerance  MUSCULOSKELETAL:  negative joint pains, muscle aches, swelling of joints  NEUROLOGICAL:  negative for headaches, dizziness, lightheadedness, numbness, pain, tingling extremities      Physical Exam:     BP (!) 153/86   Pulse 72   Temp 98.3 °F (36.8 °C) (Oral)   Resp 14   Ht 1.829 m (6')   Wt 79.4 kg (175 lb)   SpO2 100%   BMI 23.73 kg/m²   Temp (24hrs), Av.1 °F (36.7 °C), Min:97.9 °F (36.6 °C), Max:98.3 °F (36.8 °C)    No results for input(s): \"POCGLU\" in the last 72 hours.  No intake or output data in the 24 hours ending 24 1242    General Appearance:  alert, well appearing, and in no acute distress  Mental status: oriented to person, place, and time   Head:  normocephalic, atraumatic.  Neck: supple, no carotid bruits, thyroid not palpable  Lungs: Bilateral equal air entry, clear to ausculation, no wheezing, rales or rhonchi, normal effort  Cardiovascular: normal rate, regular rhythm, no murmur, gallop, rub.  Abdomen: Soft, nontender, nondistended, normal bowel sounds, no hepatomegaly or splenomegaly  Neurologic: CN II-XII intact , DTR normal, no new focal motor or sensory deficits, moving all extremities spontaneously.   Skin: No gross lesions, rashes, bruising or bleeding on exposed skin area  Extremities:  peripheral pulses palpable, no pedal edema or calf pain with 
IntraMUSCular, Q6H PRN **AND** diphenhydrAMINE (BENADRYL) injection 50 mg, 50 mg, IntraMUSCular, Q6H PRN  acetaminophen (TYLENOL) tablet 650 mg, 650 mg, Oral, Q6H PRN  ibuprofen (ADVIL;MOTRIN) tablet 400 mg, 400 mg, Oral, Q6H PRN  hydrOXYzine HCl (ATARAX) tablet 50 mg, 50 mg, Oral, TID PRN  traZODone (DESYREL) tablet 50 mg, 50 mg, Oral, Nightly PRN  polyethylene glycol (GLYCOLAX) packet 17 g, 17 g, Oral, Daily PRN  aluminum & magnesium hydroxide-simethicone (MAALOX) 200-200-20 MG/5ML suspension 30 mL, 30 mL, Oral, Q6H PRN  nicotine polacrilex (COMMIT) lozenge 2 mg, 2 mg, Oral, Q2H PRN  haloperidol (HALDOL) tablet 5 mg, 5 mg, Oral, Q6H PRN **AND** LORazepam (ATIVAN) tablet 2 mg, 2 mg, Oral, Q6H PRN    ASSESSMENT  Acute psychosis (HCC)         PATIENT HANDOFF  Patient symptoms are: Unstable  Attempt to develop insight.  Monitor need and frequency of PRN medications.  Encourage participation in groups and milieu.  Medication changes and discharge planning per attending  Follow-up daily while inpatient.     Patient continues to be monitored in the inpatient psychiatric facility at [facility] for safety and stabilization. Patient continues to need, on a daily basis, active treatment furnished directly by or requiring the supervision of inpatient psychiatric personnel.    Electronically signed by RACHEL Reyes CNP on 11/2/2024 at 5:34 PM    **This report has been created using voice recognition software. It may contain minor errors which are inherent in voice recognition technology.**   I independently saw and evaluated the patient.  I reviewed the  documentation above    .  Any additional comments or changes to the   documentation are stated below otherwise agree with assessment.      No results found for: \"QTC\"    Vitals:    11/01/24 1308 11/01/24 1934 11/02/24 0751 11/02/24 1952   BP:  (!) 171/91 (!) 157/74 (!) 153/88   Pulse:  81 70 72   Resp:  15 14 15   Temp:  98.1 °F (36.7 °C) 97.7 °F (36.5 °C) 98.3 °F (36.8 °C)

## 2024-11-04 NOTE — DISCHARGE INSTRUCTIONS
Information:  Medications:   Medication summary provided   I understand that I should take only the medications on my list.     -why and when I need to take each medicine.     -which side effects to watch for.     -that I should carry my medication information at all times in case of     Emergency situations.    I will take all of my medicines to follow up appointments.     -check with my physician or pharmacist before taking any new    Medication, over the counter product or drink alcohol.    -Ask about food, drug or dietary supplement interactions.    -discard old lists and update records with medication providers.    Notify Physician:  Notify physician if you notice:   Always call 911 if you feel your life is in danger  In case of an emergency call 911 immediately!  If 911 is not available call your local emergency medical system for help    Behavioral Health Follow Up:  Original Referral Source:Carlos  Discharge Diagnosis: Acute psychosis (HCC) [F23]  Recommendations for Level of Care: Take all medications and attend all follow up appointments.   Patient status at discharge: stable  My hospital  was: [facility]  Aftercare plan faxed: Marilou Jeong   -faxed by: staff   -date: 11/4/24   -time: 12:00  Prescriptions: [facility]    Smoking: Quit Smoking.   Call the [employer]'s smoking quitline at 8-181-55B-QUIT  Know the signs of a heart attack   If you have any of the following symptoms call 911 immediately, do not wait more    Than five minutes.    1. Pressure, fullness and/ or squeezing in the center of the chest spreading to    The jaw, neck or shoulder.    2. Chest discomfort with light headedness, fainting, sweating, nausea or    Shortness of breath.   3. Upper abdominal pressure or discomfort.   4. Lower chest pain, back pain, unusual fatigue, shortness of breath, nausea   Or dizziness.     General Information:   Questions regarding your bill: Call HELP program [phone]     Suicide Hotline ([facility]

## 2024-11-04 NOTE — DISCHARGE INSTR - DIET

## 2024-11-04 NOTE — DISCHARGE SUMMARY
DISCHARGE SUMMARY      Patient ID:  Sin Dumont  [id number]  54 y.o.  1970    Admit date: 10/31/2024    Discharge date and time: 11/4/2024    Disposition: Home     Admitting Physician: Nael Berman MD     Discharge Physician: Dr QUANG Berman MD    Admission Diagnoses: Acute psychosis (HCC) [F23]    Admission Condition: poor    Discharged Condition: stable    Admission Circumstance: Sin Dumont is a 54 y.o. male who has no significant past medical history who presented to the ER with bizarre and erratic behaviors that were placing patient at risk of harm to himself and others.   Patient was placed on a pink slip by police after he was found running around asking people to bring his dead son back from the dead. While in the ER patient was aggressive and uncooperative  and required both chemical and physical restraint to calm him.  He is currently admitted to PICU for unpredictable behaviors.  It does not appear Sin has ever been admitted to this facility in the past.  Sin is agreeable to assessment in unit milieu today.  When asked about events leading up to hospitalization he states, \"I really don't know.\"  He states, \"I was just trying to help people but I guess I was a step ahead of myself.\"  When asked to elaborate on this he states, \"Well I've been trying to save the world.\"  He reports he is tired of all the \"wicked politicians\" and that \"it has to end.\"  He reports he has been sent by God to save the world from all the \"wickedness.\"  He states he has been awake for the last \"7 days\" without any sleep however still feels energized.  Asked patient about any significant stressors in his life and again he states the \"wickedness of the world.\"  He reports that he was recently fired from his job but cannot explain why.  It also appears from previous documentation from [facility] that patient lost a son in 2017 and still struggles with this.  He states he was placed on Testerone shots but says he has been getting

## 2024-11-04 NOTE — PLAN OF CARE
Problem: Self Harm/Suicidality  Goal: Will have no self-injury during hospital stay  Description: INTERVENTIONS:  1.  Ensure constant observer at bedside with Q15M safety checks  2.  Maintain a safe environment  3.  Secure patient belongings  4.  Ensure family/visitors adhere to safety recommendations  5.  Ensure safety tray has been added to patient's diet order  6.  Every shift and PRN: Re-assess suicidal risk via Frequent Screener    11/4/2024 0534 by Garfield SUE RN  Outcome: Progressing   Patient denies thoughts of self harm. Patient remains free from injury. Patient is social with peers but has poor boundaries and is intrusive at times. Patient is compliant with his medications.
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
## 2024-11-04 NOTE — CARE COORDINATION
Name: Sin Dumont    : 1970    Auth number: [id number]     Discharge Date: 2024    Destination: Private residence    Discharge Medications:      Medication List        START taking these medications      amLODIPine 5 MG tablet  Commonly known as: NORVASC  Take 1 tablet by mouth daily  Start taking on: 2024  Notes to patient: Blood pressure management     QUEtiapine 200 MG tablet  Commonly known as: SEROQUEL  Take 1 tablet by mouth 2 times daily  Notes to patient: mood            CONTINUE taking these medications      hydrOXYzine HCl 25 MG tablet  Commonly known as: ATARAX  Notes to patient: anxiety            STOP taking these medications      Testosterone Cypionate 200 MG/ML Soln               Where to Get Your Medications        These medications were sent to [id number] - [place], [place] - [address] - P [phone] - F [phone]  [address] [address]      Phone: [phone]   amLODIPine 5 MG tablet  QUEtiapine 200 MG tablet         Follow Up Appointment: [facility]  [address] [place] [address]  P: [phone]  F: [phone]  Go on 2024  You have a mental health appointment on  at 11AM